# Patient Record
Sex: MALE | URBAN - METROPOLITAN AREA
[De-identification: names, ages, dates, MRNs, and addresses within clinical notes are randomized per-mention and may not be internally consistent; named-entity substitution may affect disease eponyms.]

---

## 2018-09-27 ENCOUNTER — TELEPHONE (OUTPATIENT)
Dept: FAMILY MEDICINE CLINIC | Facility: CLINIC | Age: 38
End: 2018-09-27

## 2018-09-27 NOTE — TELEPHONE ENCOUNTER
Med: Virtussin AC Syrup   Qty:120   Sig:  Give 5 ML Every 6 hours as needed for cough. Drug not covered by patient plan. The preferred alternative is VIRTUSSIN AC SYRUP.   Please call/fax the pharmacy to change medication along with strength, direct

## 2018-09-28 NOTE — TELEPHONE ENCOUNTER
Spoke with Demetra Goodpasture the pharmacist from [facility] who confirmed the patient did receive the rx and nothing else is needed from the provider.